# Patient Record
Sex: FEMALE | Race: WHITE | NOT HISPANIC OR LATINO | Employment: FULL TIME | URBAN - METROPOLITAN AREA
[De-identification: names, ages, dates, MRNs, and addresses within clinical notes are randomized per-mention and may not be internally consistent; named-entity substitution may affect disease eponyms.]

---

## 2019-01-26 ENCOUNTER — HOSPITAL ENCOUNTER (EMERGENCY)
Facility: OTHER | Age: 64
Discharge: HOME | End: 2019-01-26
Attending: EMERGENCY MEDICINE
Payer: COMMERCIAL

## 2019-01-26 VITALS
RESPIRATION RATE: 16 BRPM | HEART RATE: 60 BPM | SYSTOLIC BLOOD PRESSURE: 219 MMHG | OXYGEN SATURATION: 99 % | TEMPERATURE: 98 F | DIASTOLIC BLOOD PRESSURE: 102 MMHG

## 2019-01-26 DIAGNOSIS — S09.90XA INJURY OF HEAD, INITIAL ENCOUNTER: Primary | ICD-10-CM

## 2019-01-26 PROCEDURE — 99284 EMERGENCY DEPT VISIT MOD MDM: CPT

## 2019-01-26 NOTE — ED NOTES
Pt presents with c/o facial pain r/t a fall. Pt reports she tripped on the side walk and fell face first into the cement. PT denies any LOC, vision changes or dizziness. Pt does c/o pain to the left of her nose with palpation. + swelling noted to R cheek. Pt is AAOx4. RR are even and unlabored. Skin is warm and dry. Pt is ambulatory with a steady gait.

## 2019-01-26 NOTE — ED PROVIDER NOTES
Encounter Date: 2019    SCRIBE #1 NOTE: I, Luciano Mary, am scribing for, and in the presence of, Dr. Le.       History     Chief Complaint   Patient presents with    Facial Pain     trip and fall approx 1 hour PTA. Pt reports faceplanting onto asphalt, now pain to nose. Nose bleed resolved prior to arrival. Denies LOC. Pt is not on blood thinners.      Time seen by provider: 3:25 PM    This is a 63 y.o. female who presents s/p fall that occurred approximately one hour ago. The patient reports that she was walking down that street and tripped over an uneven part of the street. She states that she fell on her knees, hands, and the hit her face on the grounds. She denies loss of consciousness  She reports that the left side of her nose was bleeding after the fall. She states that she isn't in much pain, but was concerned that she could have broken her nose. She denies visual disturbances, chest pain, shortness of breath, nausea, syncope, and dysuria.  She denies blood thinner use.      The history is provided by the patient.     Review of patient's allergies indicates:  No Known Allergies  Past Medical History:   Diagnosis Date    Hypertension      Past Surgical History:   Procedure Laterality Date     SECTION      CHOLECYSTECTOMY       No family history on file.  Social History     Tobacco Use    Smoking status: Never Smoker   Substance Use Topics    Alcohol use: Yes    Drug use: No     Review of Systems   Constitutional: Negative for chills and fever.   HENT: Negative for sore throat.         Positive for facial pain.   Eyes: Negative for photophobia and visual disturbance.   Respiratory: Negative for shortness of breath.    Cardiovascular: Negative for chest pain.   Gastrointestinal: Negative for nausea and vomiting.   Genitourinary: Negative for dysuria.   Musculoskeletal: Negative for back pain and neck pain.        Positive for bilateral hand and knee pain.    Skin: Negative for rash and  wound.   Neurological: Negative for dizziness, syncope, weakness and headaches.   Hematological: Does not bruise/bleed easily.       Physical Exam     Initial Vitals [01/26/19 1504]   BP Pulse Resp Temp SpO2   (!) 233/103 63 16 97.6 °F (36.4 °C) 99 %      MAP       --         Physical Exam    Nursing note and vitals reviewed.  Constitutional: She appears well-developed and well-nourished. She is not diaphoretic. No distress.   HENT:   Head: Normocephalic.   Mild ecchymosis and swelling of the nasal bridge and right cheek.  No crepitus or step-offs.   Eyes: Conjunctivae are normal.   Pupils are 4 mm and reactive bilaterally.   Neck: Neck supple.   Cardiovascular: Normal rate, regular rhythm and normal heart sounds.   Pulmonary/Chest: Breath sounds normal. No respiratory distress. She has no wheezes. She has no rhonchi. She has no rales.   Musculoskeletal: Normal range of motion. She exhibits no edema or tenderness.   No C/T/L midline spine tenderness.   Neurological: She is alert and oriented to person, place, and time. She has normal strength. No sensory deficit. GCS score is 15. GCS eye subscore is 4. GCS verbal subscore is 5. GCS motor subscore is 6.   Ambulatory with steady gait.   Skin: Skin is warm and dry. Capillary refill takes less than 2 seconds.   Psychiatric: She has a normal mood and affect. Her behavior is normal. Thought content normal.         ED Course   Procedures  Labs Reviewed - No data to display       Imaging Results          CT Head Without Contrast (Final result)  Result time 01/26/19 16:09:58    Final result by Shreya Tucker MD (01/26/19 16:09:58)                 Impression:      No acute intracranial process seen.      Electronically signed by: Shreya Tucker MD  Date:    01/26/2019  Time:    16:09             Narrative:    EXAMINATION:  CT HEAD WITHOUT CONTRAST    CLINICAL HISTORY:  Pain, maxface;    TECHNIQUE:  Low dose axial images were obtained through the head.  Coronal and  sagittal reformations were also performed. Contrast was not administered.    COMPARISON:  None.    FINDINGS:  The brain is normally formed.  The ventricular system is normal in size, midline.  No intracranial mass or hemorrhage seen.  No subdural fluid collection.  No areas of abnormal hypoattenuation seen to suggest an acute infarction.  No evidence of sizable remote infarction.  The skull is intact.  The soft tissues appear normal.                               CT Maxillofacial Without Contrast (Final result)  Result time 01/26/19 16:11:21    Final result by Zion Vogel MD (01/26/19 16:11:21)                 Impression:      No evidence of acute fracture or malalignment involving the maxillofacial is structures.      Electronically signed by: Zion Vogel MD  Date:    01/26/2019  Time:    16:11             Narrative:    EXAMINATION:  CT MAXILLOFACIAL WITHOUT CONTRAST    CLINICAL HISTORY:  Maxface trauma blunt;    TECHNIQUE:  Low dose axial images, sagittal and coronal reformations were obtained through the face.  Contrast was not administered.    COMPARISON:  None    FINDINGS:  The visualized portions of the intracranial compartment is within normal limits.  The orbits and intraorbital contents are unremarkable.  The orbital walls are intact.    The paranasal sinuses are unremarkable.  The visualized mastoid air cells are clear.    The zygomatic arches are within normal limits.  The pterygoid plates are intact.  The mandibular condyles are within normal limits.  The mandibular and maxillary bones are within normal limits.  The nasal cavity is unremarkable.  The nasal bones are unremarkable.  There is multicarious dental disease.  There is dental hardware in place.    The visualized soft tissues are within normal limits.  There is no evidence of lymphadenopathy in the neck.    There are partially visualized degenerative changes involving the cervical spine.  No cervical spine fracture is present.                                  Medical Decision Making:   History:   Old Medical Records: I decided to obtain old medical records.  Old Records Summarized: other records.  Initial Assessment:   3:25PM:  Patient is a 63-year-old female who presents to the emergency department with a head injury after a fall.  She denies any LOC.  She denies any blood thinner use.  She does have some swelling and bruising developing to her nasal bridge and right cheek.  She has no C/T/L midline tenderness. She is hypertensive, but otherwise asymptomatic.  Will plan for imaging, will continue to follow and reassess.  Clinical Tests:   Radiological Study: Ordered and Reviewed    4:33 PM:  Pt doing well, remains stable. Her imaging is negative for any acute findings.  I updated the patient regarding results and I counseled patient regarding supportive care measures.  I did discuss with her post concussive symptoms.  I have discussed with the pt ED return warnings and need for close PCP f/u.  Pt agreeable to plan and all questions answered.  I feel that pt is stable for discharge and management as an outpatient and no further intervention is needed at this time.  Pt is comfortable returning to the ED if needed.  Will DC home in stable condition.                Scribe Attestation:   Scribe #1: I performed the above scribed service and the documentation accurately describes the services I performed. I attest to the accuracy of the note.    Attending Attestation:           Physician Attestation for Scribe:  Physician Attestation Statement for Scribe #1: I, Luciano chairez, reviewed documentation, as scribed by Dr. Le in my presence, and it is both accurate and complete.                    Clinical Impression:     1. Injury of head, initial encounter                                 Rose Le MD  01/26/19 1418